# Patient Record
Sex: MALE | Race: WHITE | NOT HISPANIC OR LATINO | Employment: OTHER | URBAN - METROPOLITAN AREA
[De-identification: names, ages, dates, MRNs, and addresses within clinical notes are randomized per-mention and may not be internally consistent; named-entity substitution may affect disease eponyms.]

---

## 2018-04-03 ENCOUNTER — RECORDS - HEALTHEAST (OUTPATIENT)
Dept: LAB | Facility: CLINIC | Age: 28
End: 2018-04-03

## 2018-04-03 ENCOUNTER — HOSPITAL ENCOUNTER (OUTPATIENT)
Dept: LAB | Age: 28
Setting detail: SPECIMEN
Discharge: HOME | End: 2018-04-03

## 2018-04-04 LAB
ALBUMIN SERPL-MCNC: 3.9 G/DL (ref 3.5–5)
ALP SERPL-CCNC: 72 U/L (ref 45–120)
ALT SERPL W P-5'-P-CCNC: 18 U/L (ref 0–45)
ANION GAP SERPL CALCULATED.3IONS-SCNC: 12 MMOL/L (ref 5–18)
AST SERPL W P-5'-P-CCNC: 17 U/L (ref 0–40)
BILIRUB SERPL-MCNC: 0.2 MG/DL (ref 0–1)
BUN SERPL-MCNC: 15 MG/DL (ref 8–22)
CALCIUM SERPL-MCNC: 9.1 MG/DL (ref 8.5–10.5)
CHLORIDE BLD-SCNC: 107 MMOL/L (ref 98–107)
CO2 SERPL-SCNC: 23 MMOL/L (ref 22–31)
CREAT SERPL-MCNC: 0.83 MG/DL (ref 0.7–1.3)
ERYTHROCYTE [DISTWIDTH] IN BLOOD BY AUTOMATED COUNT: 11.9 % (ref 11–14.5)
GFR SERPL CREATININE-BSD FRML MDRD: >60 ML/MIN/1.73M2
GLUCOSE BLD-MCNC: 83 MG/DL (ref 70–125)
HCT VFR BLD AUTO: 40.6 % (ref 40–54)
HGB BLD-MCNC: 13.6 G/DL (ref 14–18)
MCH RBC QN AUTO: 30.6 PG (ref 27–34)
MCHC RBC AUTO-ENTMCNC: 33.5 G/DL (ref 32–36)
MCV RBC AUTO: 91 FL (ref 80–100)
PLATELET # BLD AUTO: 190 THOU/UL (ref 140–440)
PMV BLD AUTO: 12.4 FL (ref 8.5–12.5)
POTASSIUM BLD-SCNC: 3.7 MMOL/L (ref 3.5–5)
PROLACTIN SERPL-MCNC: 6.1 NG/ML (ref 0–15)
PROT SERPL-MCNC: 6.8 G/DL (ref 6–8)
RBC # BLD AUTO: 4.44 MILL/UL (ref 4.4–6.2)
SODIUM SERPL-SCNC: 142 MMOL/L (ref 136–145)
TSH SERPL DL<=0.005 MIU/L-ACNC: 1.22 UIU/ML (ref 0.3–5)
WBC: 6 THOU/UL (ref 4–11)

## 2019-07-26 ENCOUNTER — RECORDS - HEALTHEAST (OUTPATIENT)
Dept: LAB | Facility: CLINIC | Age: 29
End: 2019-07-26

## 2019-07-26 LAB
ALBUMIN SERPL-MCNC: 4.5 G/DL (ref 3.5–5)
ALP SERPL-CCNC: 70 U/L (ref 45–120)
ALT SERPL W P-5'-P-CCNC: 11 U/L (ref 0–45)
ANION GAP SERPL CALCULATED.3IONS-SCNC: 10 MMOL/L (ref 5–18)
AST SERPL W P-5'-P-CCNC: 14 U/L (ref 0–40)
BILIRUB SERPL-MCNC: 0.3 MG/DL (ref 0–1)
BUN SERPL-MCNC: 14 MG/DL (ref 8–22)
CALCIUM SERPL-MCNC: 9.9 MG/DL (ref 8.5–10.5)
CHLORIDE BLD-SCNC: 108 MMOL/L (ref 98–107)
CO2 SERPL-SCNC: 26 MMOL/L (ref 22–31)
CREAT SERPL-MCNC: 1.03 MG/DL (ref 0.7–1.3)
GFR SERPL CREATININE-BSD FRML MDRD: >60 ML/MIN/1.73M2
GLUCOSE BLD-MCNC: 74 MG/DL (ref 70–125)
POTASSIUM BLD-SCNC: 3.6 MMOL/L (ref 3.5–5)
PROLACTIN SERPL-MCNC: 15.1 NG/ML (ref 0–15)
PROT SERPL-MCNC: 7 G/DL (ref 6–8)
SODIUM SERPL-SCNC: 144 MMOL/L (ref 136–145)
TSH SERPL DL<=0.005 MIU/L-ACNC: 1.11 UIU/ML (ref 0.3–5)

## 2021-04-14 ENCOUNTER — VIRTUAL VISIT (OUTPATIENT)
Dept: NEUROLOGY | Facility: CLINIC | Age: 31
End: 2021-04-14
Payer: MEDICAID

## 2021-04-14 VITALS — WEIGHT: 138 LBS | HEIGHT: 70 IN | BODY MASS INDEX: 19.76 KG/M2

## 2021-04-14 DIAGNOSIS — F84.0 AUTISM: ICD-10-CM

## 2021-04-14 DIAGNOSIS — G40.409 GENERALIZED TONIC-CLONIC SEIZURE (H): ICD-10-CM

## 2021-04-14 PROCEDURE — 99442 PR PHYSICIAN TELEPHONE EVALUATION 11-20 MIN: CPT | Performed by: PSYCHIATRY & NEUROLOGY

## 2021-04-14 RX ORDER — OMEPRAZOLE 40 MG/1
CAPSULE, DELAYED RELEASE ORAL
COMMUNITY
Start: 2021-03-01

## 2021-04-14 RX ORDER — DIPHENHYDRAMINE HCL 50 MG/1
CAPSULE ORAL
COMMUNITY
Start: 2021-04-02

## 2021-04-14 RX ORDER — QUETIAPINE FUMARATE 100 MG/1
TABLET, FILM COATED ORAL
COMMUNITY
Start: 2021-04-02

## 2021-04-14 RX ORDER — CLONIDINE HYDROCHLORIDE 0.2 MG/1
TABLET ORAL
COMMUNITY
Start: 2021-04-02

## 2021-04-14 RX ORDER — LACTASE 3000 UNIT
TABLET ORAL
COMMUNITY
Start: 2021-04-06

## 2021-04-14 SDOH — HEALTH STABILITY: MENTAL HEALTH: HOW MANY STANDARD DRINKS CONTAINING ALCOHOL DO YOU HAVE ON A TYPICAL DAY?: NOT ASKED

## 2021-04-14 SDOH — HEALTH STABILITY: MENTAL HEALTH: HOW OFTEN DO YOU HAVE 6 OR MORE DRINKS ON ONE OCCASION?: NOT ASKED

## 2021-04-14 SDOH — HEALTH STABILITY: MENTAL HEALTH: HOW OFTEN DO YOU HAVE A DRINK CONTAINING ALCOHOL?: NOT ASKED

## 2021-04-14 ASSESSMENT — MIFFLIN-ST. JEOR: SCORE: 1592.21

## 2021-04-14 NOTE — LETTER
4/14/2021         RE: Wai Tinsley  [address] [address] [place] [id number]        Dear Colleague,    Thank you for referring your patient, Wai Tinsley, to the [facility] NEUROLOGY [facility]INIC [place]. Please see a copy of my visit note below.    [facility] Neurology  [place]    Wai Tinsley MRN# [id number]   Age: 30 year old YOB: 1990               Assessment and Plan:   Assessment:   Second seizure -- spontaneous versus provoked  Autism        Plan:   Orders Placed This Encounter   Procedures     EEG Routine     We will try to get an EEG to look for underlying seizure disorder.  As I discussed with Wai's caregiver from the group home, the EEG could help us decide whether we need to start him on antiseizure medication.  If we are not able to get the EEG we will have to consider going ahead with antiseizure medication.  In that case, lamotrigine might be a good 1 to try.             Chief Complaint/HPI:     This is a telephone consultation regarding Wai.  This is a 30-year-old autistic gentleman, nonverbal, who is evaluated after a seizure a couple weeks ago.  He was seen at [facility] emergency room.  He had a generalized tonic-clonic seizure lasting a couple minutes just after the car ride to his supervised employment.  EMS was called, on route he had high glucose, but in the emergency room his glucose level dropped significantly and concern was raised that hypoglycemia may have caused his seizure.  He was postictal initially (less responsive), but was back to his baseline when he was in the emergency room.  He had a similar episode back on April 19, 2019, again seen at [facility].  At the request of his family, no antiseizure medications were started at that time.  Staff were told that he already had a neurology appointment coming up the following week.  He did have an appointment at our office then on June 12, 2019 but did not come to that appointment.    CT  of the head done March 29, 2021 is normal, images personally reviewed            Past Medical History:    has no past medical history on file.          Past Surgical History:    has no past surgical history on file.          Social History:     Social History     Tobacco Use     Smoking status: Never Smoker     Smokeless tobacco: Never Used   Substance Use Topics     Alcohol use: Not Currently             Family History:     Family History   Family history unknown: Yes                Allergies:   No Known Allergies          Medications:     Current Outpatient Medications:      BANOPHEN 50 MG capsule, , Disp: , Rfl:      cloNIDine (CATAPRES) 0.2 MG tablet, , Disp: , Rfl:      LACTASE ENZYME 3000 units tablet, , Disp: , Rfl:      omeprazole (PRILOSEC) 40 MG DR capsule, , Disp: , Rfl:      QUEtiapine (SEROQUEL) 100 MG tablet, , Disp: , Rfl:               Physical Exam:   The patient is nonverbal, I was unable to evaluate him on today's telephone visit  He is described as awake, interactive and at baseline.       13 minutes were spent on the phone today.      Jose Vick MD            Again, thank you for allowing me to participate in the care of your patient.        Sincerely,        Jose Vick MD

## 2021-04-14 NOTE — PROGRESS NOTES
[facility] Neurology  [place]    Wai Tinsley MRN# [id number]   Age: 30 year old YOB: 1990               Assessment and Plan:   Assessment:   Second seizure -- spontaneous versus provoked  Autism        Plan:   Orders Placed This Encounter   Procedures     EEG Routine     We will try to get an EEG to look for underlying seizure disorder.  As I discussed with Wai's caregiver from the group home, the EEG could help us decide whether we need to start him on antiseizure medication.  If we are not able to get the EEG we will have to consider going ahead with antiseizure medication.  In that case, lamotrigine might be a good 1 to try.             Chief Complaint/HPI:     This is a telephone consultation regarding Wai.  This is a 30-year-old autistic gentleman, nonverbal, who is evaluated after a seizure a couple weeks ago.  He was seen at [facility] emergency room.  He had a generalized tonic-clonic seizure lasting a couple minutes just after the car ride to his supervised employment.  EMS was called, on route he had high glucose, but in the emergency room his glucose level dropped significantly and concern was raised that hypoglycemia may have caused his seizure.  He was postictal initially (less responsive), but was back to his baseline when he was in the emergency room.  He had a similar episode back on April 19, 2019, again seen at [facility].  At the request of his family, no antiseizure medications were started at that time.  Staff were told that he already had a neurology appointment coming up the following week.  He did have an appointment at our office then on June 12, 2019 but did not come to that appointment.    CT of the head done March 29, 2021 is normal, images personally reviewed            Past Medical History:    has no past medical history on file.          Past Surgical History:    has no past surgical history on file.          Social History:     Social History      Tobacco Use     Smoking status: Never Smoker     Smokeless tobacco: Never Used   Substance Use Topics     Alcohol use: Not Currently             Family History:     Family History   Family history unknown: Yes                Allergies:   No Known Allergies          Medications:     Current Outpatient Medications:      BANOPHEN 50 MG capsule, , Disp: , Rfl:      cloNIDine (CATAPRES) 0.2 MG tablet, , Disp: , Rfl:      LACTASE ENZYME 3000 units tablet, , Disp: , Rfl:      omeprazole (PRILOSEC) 40 MG DR capsule, , Disp: , Rfl:      QUEtiapine (SEROQUEL) 100 MG tablet, , Disp: , Rfl:               Physical Exam:   The patient is nonverbal, I was unable to evaluate him on today's telephone visit  He is described as awake, interactive and at baseline.       13 minutes were spent on the phone today.      Jose Vick MD

## 2022-06-24 ENCOUNTER — HOSPITAL ENCOUNTER (EMERGENCY)
Facility: HOSPITAL | Age: 32
Discharge: HOME | End: 2022-06-24
Attending: EMERGENCY MEDICINE | Admitting: EMERGENCY MEDICINE
Payer: MEDICAID

## 2022-06-24 ENCOUNTER — DOCUMENTATION ONLY (OUTPATIENT)
Dept: OTHER | Facility: CLINIC | Age: 32
End: 2022-06-24

## 2022-06-24 VITALS
SYSTOLIC BLOOD PRESSURE: 102 MMHG | HEART RATE: 78 BPM | OXYGEN SATURATION: 100 % | TEMPERATURE: 99 F | BODY MASS INDEX: 20.81 KG/M2 | WEIGHT: 145 LBS | RESPIRATION RATE: 18 BRPM | DIASTOLIC BLOOD PRESSURE: 62 MMHG

## 2022-06-24 DIAGNOSIS — R56.9 SEIZURE (H): ICD-10-CM

## 2022-06-24 LAB
ANION GAP SERPL CALCULATED.3IONS-SCNC: 4 MMOL/L (ref 5–18)
BASOPHILS # BLD AUTO: 0 10E3/UL (ref 0–0.2)
BASOPHILS NFR BLD AUTO: 1 %
BUN SERPL-MCNC: 14 MG/DL (ref 8–22)
CALCIUM SERPL-MCNC: 9 MG/DL (ref 8.5–10.5)
CHLORIDE BLD-SCNC: 110 MMOL/L (ref 98–107)
CO2 SERPL-SCNC: 27 MMOL/L (ref 22–31)
CREAT SERPL-MCNC: 0.9 MG/DL (ref 0.7–1.3)
EOSINOPHIL # BLD AUTO: 0.1 10E3/UL (ref 0–0.7)
EOSINOPHIL NFR BLD AUTO: 2 %
ERYTHROCYTE [DISTWIDTH] IN BLOOD BY AUTOMATED COUNT: 11.9 % (ref 10–15)
GFR SERPL CREATININE-BSD FRML MDRD: >90 ML/MIN/1.73M2
GLUCOSE BLD-MCNC: 62 MG/DL (ref 70–125)
HCT VFR BLD AUTO: 38.3 % (ref 40–53)
HGB BLD-MCNC: 12.6 G/DL (ref 13.3–17.7)
IMM GRANULOCYTES # BLD: 0 10E3/UL
IMM GRANULOCYTES NFR BLD: 0 %
LYMPHOCYTES # BLD AUTO: 0.9 10E3/UL (ref 0.8–5.3)
LYMPHOCYTES NFR BLD AUTO: 31 %
MCH RBC QN AUTO: 30.5 PG (ref 26.5–33)
MCHC RBC AUTO-ENTMCNC: 32.9 G/DL (ref 31.5–36.5)
MCV RBC AUTO: 93 FL (ref 78–100)
MONOCYTES # BLD AUTO: 0.3 10E3/UL (ref 0–1.3)
MONOCYTES NFR BLD AUTO: 10 %
NEUTROPHILS # BLD AUTO: 1.5 10E3/UL (ref 1.6–8.3)
NEUTROPHILS NFR BLD AUTO: 56 %
NRBC # BLD AUTO: 0 10E3/UL
NRBC BLD AUTO-RTO: 0 /100
PLATELET # BLD AUTO: 141 10E3/UL (ref 150–450)
POTASSIUM BLD-SCNC: 4 MMOL/L (ref 3.5–5)
RBC # BLD AUTO: 4.13 10E6/UL (ref 4.4–5.9)
SODIUM SERPL-SCNC: 141 MMOL/L (ref 136–145)
WBC # BLD AUTO: 2.7 10E3/UL (ref 4–11)

## 2022-06-24 PROCEDURE — 250N000013 HC RX MED GY IP 250 OP 250 PS 637: Performed by: EMERGENCY MEDICINE

## 2022-06-24 PROCEDURE — 36415 COLL VENOUS BLD VENIPUNCTURE: CPT | Performed by: EMERGENCY MEDICINE

## 2022-06-24 PROCEDURE — 82310 ASSAY OF CALCIUM: CPT | Performed by: EMERGENCY MEDICINE

## 2022-06-24 PROCEDURE — 85004 AUTOMATED DIFF WBC COUNT: CPT | Performed by: EMERGENCY MEDICINE

## 2022-06-24 PROCEDURE — 99283 EMERGENCY DEPT VISIT LOW MDM: CPT

## 2022-06-24 RX ORDER — QUETIAPINE FUMARATE 100 MG/1
300 TABLET, FILM COATED ORAL ONCE
Status: COMPLETED | OUTPATIENT
Start: 2022-06-24 | End: 2022-06-24

## 2022-06-24 RX ORDER — DIVALPROEX SODIUM 500 MG/1
500 TABLET, EXTENDED RELEASE ORAL AT BEDTIME
Qty: 45 TABLET | Refills: 0 | Status: SHIPPED | OUTPATIENT
Start: 2022-06-25 | End: 2022-08-09

## 2022-06-24 RX ORDER — DIVALPROEX SODIUM 500 MG/1
500 TABLET, EXTENDED RELEASE ORAL ONCE
Status: COMPLETED | OUTPATIENT
Start: 2022-06-24 | End: 2022-06-24

## 2022-06-24 RX ADMIN — DIVALPROEX SODIUM 500 MG: 500 TABLET, FILM COATED, EXTENDED RELEASE ORAL at 13:21

## 2022-06-24 RX ADMIN — QUETIAPINE FUMARATE 300 MG: 100 TABLET ORAL at 13:22

## 2022-06-24 NOTE — ED TRIAGE NOTES
Patient resides in group home for autism. He was reaching to pick something up, eyes rolled back into head and he fell. Staff reports 5-7 minutes of shaking seizure activity. He has been alert since this episode. Unsure if its seizure or behavioral. 136 blood sugar en route.      Triage Assessment     Row Name 06/24/22 1044       Triage Assessment (Adult)    Airway WDL WDL    Additional Documentation Breath Sounds (Group)       Respiratory WDL    Respiratory WDL WDL       Breath Sounds    Breath Sounds All Fields       Skin Circulation/Temperature WDL    Skin Circulation/Temperature WDL WDL       Cardiac WDL    Cardiac WDL WDL       Peripheral/Neurovascular WDL    Peripheral Neurovascular WDL WDL       Cognitive/Neuro/Behavioral WDL    Cognitive/Neuro/Behavioral WDL arousability;level of consciousness    Level of Consciousness alert    Arousal Level opens eyes spontaneously

## 2022-06-24 NOTE — DISCHARGE INSTRUCTIONS
Wai was seen today in the emergency department at [facility] for seizure.  Lab studies today looked stable.  We discussed his case with neurology.  They would like to start him on Depakote extended release at nighttime 500 mg daily.  This was sent to his pharmacy.  He was given a first dose here in the emergency department today and can start the medication as soon as tomorrow.  Please continue monitoring him and return to the emergency department if he has any further episodes.  The neurology clinic would like to see him as soon as possible within the next few weeks to continue his care.

## 2022-06-24 NOTE — ED PROVIDER NOTES
EMERGENCY DEPARTMENT ENCOUNTER      NAME: Wai Tinsley  AGE: 31 year old male  YOB: 1990  MRN: [id number]  EVALUATION DATE & TIME: 6/24/2022 10:38 AM    PCP: Dariela Leiva    ED PROVIDER: Osbaldo Valencia M.D.      Chief Complaint   Patient presents with     Seizures       FINAL IMPRESSION:  1. Seizure (H)        ED COURSE & MEDICAL DECISION MAKING:    10:41 AM I met with the patient to gather history and to perform my initial exam. We discussed plans for the ED course, including diagnostic testing and treatment.   11:41 AM Rechecked and updated patient. I discussed plans for discharge with the patient, which they were agreeable to. We discussed supportive cares at home and reasons for return to the ER including new or worsening symptoms. All questions and concerns were addressed. Patient to be discharged by RN.      31 year old male presents to the Emergency Department for evaluation of seizure.  Patient is vitally stable and well-appearing when he arrives to the emergency department.  He is back to his neurological baseline per facility staff.  He has had a couple episodes of described seizure activity in the past, was unable to follow through with any EEG given difficulty with getting tests done due to his severe autism.  Patient is able to follow simple commands here.  Blood sugar in route was within normal limits, here it is borderline low in the 60s., he was given some food.  With now 3 episodes of similar tonic-clonic seizure activity, I do think he would benefit from an antiepileptic agent.  I discussed the case with Dr. Maciel of the neurology service.  We are going to start him on Depakote 500 mg at bedtime which also might help with some of his behavioral complications from his autism.  He had sounds like already has plans to follow-up in neurology clinic in July within the next few weeks so I think this would be a good follow-up interval to review this episode and come up with a  long-term treatment plan from clinic.  I do not think any new neuroimaging would be required given absence of any trauma and that this was completed within the last few years.  Discussed everything with both his mother over the phone and staff from his facility.  They are comfortable with the plan.  Patient was discharged in good condition.      MEDICATIONS GIVEN IN THE EMERGENCY:  Medications   divalproex sodium extended-release (DEPAKOTE ER) 24 hr tablet 500 mg (has no administration in time range)       NEW PRESCRIPTIONS STARTED AT TODAY'S ER VISIT  New Prescriptions    DIVALPROEX SODIUM EXTENDED-RELEASE (DEPAKOTE ER) 500 MG 24 HR TABLET    Take 1 tablet (500 mg) by mouth At Bedtime for 45 days          =================================================================    HPI    HPI is severely limited due to patient cognitive impairment.     Patient information was obtained from: Patient's day program staff    Use of : N/A      Wai Tinsley is a 31 year old male with a pertinent medical history of autism, developmental non-verbal disorder, seizure, generalized tonic-clonic seizure, hypoglycemia who presents to this ED via EMS for evaluation of fall and seizure-like activity.    Patient's day program staff reports that just prior to arrival the patient bent over to reach for something when the patient's eyes rolled back, he fell to the left-side, and then had approximately 5-7 minutes of seizure-like activity. They state following this episode the patient was helped to a chair where he gradually became alert and back to his baseline self. They also mention that prior to the fall and seizure-like activity the patient was at baseline and at normal state of health. The report that en route to the ED EMS found the patient's blood sugar to be 136. They endorse knowing only one episode of patient seizure in the past. They endorse a patient PMH of autism and note he is non-verbal at baseline. They deny any  other patient complications at this time.       REVIEW OF SYSTEMS   Unable to obtain ROS due to patient cognitive impairment.     PAST MEDICAL HISTORY:  History reviewed. No pertinent past medical history.    PAST SURGICAL HISTORY:  History reviewed. No pertinent surgical history.        CURRENT MEDICATIONS:    Current Facility-Administered Medications   Medication     divalproex sodium extended-release (DEPAKOTE ER) 24 hr tablet 500 mg     Current Outpatient Medications   Medication     [START ON 6/25/2022] divalproex sodium extended-release (DEPAKOTE ER) 500 MG 24 hr tablet     BANOPHEN 50 MG capsule     cloNIDine (CATAPRES) 0.2 MG tablet     LACTASE ENZYME 3000 units tablet     omeprazole (PRILOSEC) 40 MG DR capsule     QUEtiapine (SEROQUEL) 100 MG tablet         ALLERGIES:  Allergies   Allergen Reactions     Other Environmental Allergy Unknown       FAMILY HISTORY:  Family History   Family history unknown: Yes       SOCIAL HISTORY:   Social History     Socioeconomic History     Marital status: Single   Tobacco Use     Smoking status: Never Smoker     Smokeless tobacco: Never Used   Substance and Sexual Activity     Alcohol use: Not Currently       VITALS:  /62   Pulse 78   Temp 99  F (37.2  C)   Resp 18   Wt 65.8 kg (145 lb)   SpO2 100%   BMI 20.81 kg/m      PHYSICAL EXAM    Constitutional: Well developed, Well nourished, NAD.  HENT: Normocephalic, Atraumatic. Neck Supple.  Eyes: EOMI, Conjunctiva normal.  Respiratory: Breathing comfortably on room air. Lungs clear to ascultation.  Cardiovascular: Normal heart rate, Regular rhythm. No peripheral edema.  Abdomen: Soft, nontender  Musculoskeletal: Good range of motion in all major joints. No major deformities noted.  Integument: Warm, Dry.  Neurologic: Awake and alert.  Nonverbal (baseline).  Face is symmetric.  Moving all extremities purposefully and following simple commands.  Psychiatric: Cooperative. Affect appropriate.     LAB:  All pertinent labs  reviewed and interpreted.  Labs Ordered and Resulted from Time of ED Arrival to Time of ED Departure   BASIC METABOLIC PANEL - Abnormal       Result Value    Sodium 141      Potassium 4.0      Chloride 110 (*)     Carbon Dioxide (CO2) 27      Anion Gap 4 (*)     Urea Nitrogen 14      Creatinine 0.90      Calcium 9.0      Glucose 62 (*)     GFR Estimate >90     CBC WITH PLATELETS AND DIFFERENTIAL - Abnormal    WBC Count 2.7 (*)     RBC Count 4.13 (*)     Hemoglobin 12.6 (*)     Hematocrit 38.3 (*)     MCV 93      MCH 30.5      MCHC 32.9      RDW 11.9      Platelet Count 141 (*)     % Neutrophils 56      % Lymphocytes 31      % Monocytes 10      % Eosinophils 2      % Basophils 1      % Immature Granulocytes 0      NRBCs per 100 WBC 0      Absolute Neutrophils 1.5 (*)     Absolute Lymphocytes 0.9      Absolute Monocytes 0.3      Absolute Eosinophils 0.1      Absolute Basophils 0.0      Absolute Immature Granulocytes 0.0      Absolute NRBCs 0.0           I, Anatoly Bernard, am serving as a scribe to document services personally performed by Dr. Osbaldo Valencia, based on my observation and the provider's statements to me. I, Osbaldo Valencia MD attest that Anatoly Bernard is acting in a scribe capacity, has observed my performance of the services and has documented them in accordance with my direction.    Osbaldo Valencia M.D.  Emergency Medicine  [facility] EMERGENCY DEPARTMENT  [address] [place] [id number] [phone]  Dept: [phone]       Osbaldo Valencia MD  06/24/22 122

## 2022-07-31 ENCOUNTER — HEALTH MAINTENANCE LETTER (OUTPATIENT)
Age: 32
End: 2022-07-31

## 2022-10-15 ENCOUNTER — HEALTH MAINTENANCE LETTER (OUTPATIENT)
Age: 32
End: 2022-10-15

## 2023-06-14 ENCOUNTER — HOSPITAL ENCOUNTER (EMERGENCY)
Facility: HOSPITAL | Age: 33
Discharge: HOME | End: 2023-06-14
Attending: EMERGENCY MEDICINE | Admitting: EMERGENCY MEDICINE
Payer: MEDICAID

## 2023-06-14 ENCOUNTER — TELEPHONE (OUTPATIENT)
Dept: EMERGENCY MEDICINE | Facility: HOSPITAL | Age: 33
End: 2023-06-14

## 2023-06-14 VITALS
DIASTOLIC BLOOD PRESSURE: 77 MMHG | RESPIRATION RATE: 16 BRPM | TEMPERATURE: 98 F | HEIGHT: 69 IN | OXYGEN SATURATION: 99 % | HEART RATE: 81 BPM | SYSTOLIC BLOOD PRESSURE: 121 MMHG | BODY MASS INDEX: 21.49 KG/M2 | WEIGHT: 145.06 LBS

## 2023-06-14 DIAGNOSIS — G40.909 NONINTRACTABLE EPILEPSY WITHOUT STATUS EPILEPTICUS, UNSPECIFIED EPILEPSY TYPE (H): ICD-10-CM

## 2023-06-14 LAB
ALBUMIN SERPL BCG-MCNC: 3.9 G/DL (ref 3.5–5.2)
ALP SERPL-CCNC: 64 U/L (ref 40–129)
ALT SERPL W P-5'-P-CCNC: 90 U/L (ref 0–70)
ANION GAP SERPL CALCULATED.3IONS-SCNC: 14 MMOL/L (ref 7–15)
AST SERPL W P-5'-P-CCNC: 56 U/L (ref 0–45)
BILIRUB SERPL-MCNC: 0.2 MG/DL
BUN SERPL-MCNC: 13.2 MG/DL (ref 6–20)
CALCIUM SERPL-MCNC: 9.2 MG/DL (ref 8.6–10)
CHLORIDE SERPL-SCNC: 103 MMOL/L (ref 98–107)
CREAT SERPL-MCNC: 1.03 MG/DL (ref 0.67–1.17)
DEPRECATED HCO3 PLAS-SCNC: 18 MMOL/L (ref 22–29)
GFR SERPL CREATININE-BSD FRML MDRD: >90 ML/MIN/1.73M2
GLUCOSE BLDC GLUCOMTR-MCNC: 70 MG/DL (ref 70–99)
GLUCOSE SERPL-MCNC: 66 MG/DL (ref 70–99)
HOLD SPECIMEN: NORMAL
POTASSIUM SERPL-SCNC: 4.7 MMOL/L (ref 3.4–5.3)
PROT SERPL-MCNC: 7.1 G/DL (ref 6.4–8.3)
SODIUM SERPL-SCNC: 135 MMOL/L (ref 136–145)
VALPROATE SERPL-MCNC: 47.1 UG/ML

## 2023-06-14 PROCEDURE — 99283 EMERGENCY DEPT VISIT LOW MDM: CPT

## 2023-06-14 PROCEDURE — 80164 ASSAY DIPROPYLACETIC ACD TOT: CPT | Performed by: EMERGENCY MEDICINE

## 2023-06-14 PROCEDURE — 80053 COMPREHEN METABOLIC PANEL: CPT | Performed by: EMERGENCY MEDICINE

## 2023-06-14 PROCEDURE — 82962 GLUCOSE BLOOD TEST: CPT

## 2023-06-14 PROCEDURE — 250N000013 HC RX MED GY IP 250 OP 250 PS 637: Performed by: EMERGENCY MEDICINE

## 2023-06-14 PROCEDURE — 36415 COLL VENOUS BLD VENIPUNCTURE: CPT | Performed by: EMERGENCY MEDICINE

## 2023-06-14 RX ORDER — DIVALPROEX SODIUM 500 MG/1
500 TABLET, EXTENDED RELEASE ORAL ONCE
Status: COMPLETED | OUTPATIENT
Start: 2023-06-14 | End: 2023-06-14

## 2023-06-14 RX ADMIN — DIVALPROEX SODIUM 500 MG: 500 TABLET, FILM COATED, EXTENDED RELEASE ORAL at 14:26

## 2023-06-14 ASSESSMENT — ACTIVITIES OF DAILY LIVING (ADL)
ADLS_ACUITY_SCORE: 35
ADLS_ACUITY_SCORE: 35

## 2023-06-14 NOTE — ED PROVIDER NOTES
"EMERGENCY DEPARTMENT ENCOUNTER            IMPRESSION:  Epileptic seizure        MEDICAL DECISION MAKING:  It was my pleasure to provide care for Wai Tinsley who presented for evaluation of seizure.  Patient has history of seizure disorder.  Care staff reports compliance with medication.  Seizure was prolonged and Valium was administered prehospital.    Patient arrived awake and alert without any postictal symptoms or distress    Seizure precautions ordered    Significant laboratory findings include subtherapeutic Depakote.  Additional Depakote was administered.  Bicarb slightly low    ED evaluation is consistent with elliptic seizure.  May have been precipitated by subtherapeutic Depakote.      Spoke with guardian and group home staff    Return to care facility    =================================================================  CHIEF COMPLAINT:  Chief Complaint   Patient presents with     Seizures     Witnessed tonic clonic seizure at [facility] day [facility]; pt lives in group home-  notified group home; pt has a history of seizures; vitals stable; pt is non verbal autistic; per attendant pt is a \"runner\". Day care administered 2mg valium (buccal); pt has had no more seizure activity.          HPI  Wai Tinsley is a 32 year old male with a history of autism, seizure, developmental non verbal disorder who presents to the ED by EMS for evaluation of seizures.    Per home staff, patient had a prolonged seizure today that lasted for 6 minutes. He has a history of seizures but they have never lasted this long which prompted staff to call EMS.  Patient has been taking his daily medications.      REVIEW OF SYSTEMS   Constitutional: Does not report chills, unintentional weight loss or fatigue   Eyes: Does not report visual changes or discharge    HENT: Does not report sore throat, ear pain or neck pain  Respiratory: Does not report cough or shortness of breath    Cardiovascular: Does not report chest pain, " "palpitations or leg swelling  GI: Does not report abdominal pain, nausea, vomiting, or dark, bloody stools.    : Does not report hematuria, dysuria, or flank pain  Musculoskeletal: Does not report any new musculoskeletal pain or new muscle/joint pains  Skin: Does not report rash or wound  Neurologic: Does not report current headache, new weakness, focal weakness, or sensory changes. Positive for seizures.       Remainder of systems reviewed, unless noted in HPI all others negative.      PAST MEDICAL HISTORY:  No past medical history on file.    PAST SURGICAL HISTORY:  No past surgical history on file.      CURRENT MEDICATIONS:    BANOPHEN 50 MG capsule  cloNIDine (CATAPRES) 0.2 MG tablet  divalproex sodium extended-release (DEPAKOTE ER) 500 MG 24 hr tablet  LACTASE ENZYME 3000 units tablet  omeprazole (PRILOSEC) 40 MG DR capsule  QUEtiapine (SEROQUEL) 100 MG tablet        ALLERGIES:  Allergies   Allergen Reactions     Other Environmental Allergy Unknown       FAMILY HISTORY:  Family History   Family history unknown: Yes       SOCIAL HISTORY:   Social History     Socioeconomic History     Marital status: Single   Tobacco Use     Smoking status: Never     Smokeless tobacco: Never   Substance and Sexual Activity     Alcohol use: Not Currently       PHYSICAL EXAM:    /79   Pulse 86   Temp 98  F (36.7  C) (Oral)   Resp 18   Ht 1.753 m (5' 9\")   Wt 65.8 kg (145 lb 1 oz)   SpO2 94%   BMI 21.42 kg/m      Constitutional: Awake alert no distress, nonverbal  Head: Normocephalic, atraumatic.  ENT: Mucous membranes are moist.  No pallor.   Eyes: Pupils are reactive.  No discoloration.  Neck: No lymphadenopathy, no stridor, supple, no soft tissue swelling  Chest: No tenderness   Respiratory: Respirations even, unlabored. Lungs clear to ascultation bilaterally, in no acute respiratory distress.  Cardiovascular: Regular rate and rhythm.  Good overall perfusion.  Upper and lower extremity pulses are equal.  GI: " Abdomen soft, non-tender to palpation.  No guarding or rebound. Bowel sounds present throughout.   Back: No CVA tenderness.    Musculoskeletal: Moves all 4 extremities equally, full function and capacity no peripheral edema.   Integument: Warm, dry. No rash. No bruising or petechiae.  Neurologic: Alert & oriented x 3. Normal speech. Grossly normal motor and sensory function. No focal deficits noted.  NIHSS = 0  Psychiatric: Normal mood and affect.  Appropriate judgement.    ED COURSE:  12:36 PM I introduced myself to the patient, obtained patient history, performed a physical exam, and discussed plan for ED workup including potential diagnostic laboratory/imaging studies and interventions.  1:56 PM I spoke with patient's mother Kaitlynn ([phone]) regarding patient.     Medical Decision Making    History:    Supplemental history from: Family group home staff paramedics    External Record(s) reviewed: External medical records including care everywhere reviewed    Work Up:    Laboratory and imaging studies as ordered were independently reviewed by the provider    Broad differential diagnosis considered for seizure    The patient's presentation was of moderate complexity.       Complicating factors:    Patient has a complicated past medical history including seizure    Care affected by social determinants of health: Access to primary care    Disposition involved shared decision-making with the patient.  Patient otherwise to continue outpatient medications as prescribed.       LAB:  Laboratory results were independently reviewed and interpreted  Results for orders placed or performed during the hospital encounter of 06/14/23   Comprehensive metabolic panel   Result Value Ref Range    Sodium 135 (L) 136 - 145 mmol/L    Potassium 4.7 3.4 - 5.3 mmol/L    Chloride 103 98 - 107 mmol/L    Carbon Dioxide (CO2) 18 (L) 22 - 29 mmol/L    Anion Gap 14 7 - 15 mmol/L    Urea Nitrogen 13.2 6.0 - 20.0 mg/dL    Creatinine 1.03 0.67 -  1.17 mg/dL    Calcium 9.2 8.6 - 10.0 mg/dL    Glucose 66 (L) 70 - 99 mg/dL    Alkaline Phosphatase 64 40 - 129 U/L    AST 56 (H) 0 - 45 U/L    ALT 90 (H) 0 - 70 U/L    Protein Total 7.1 6.4 - 8.3 g/dL    Albumin 3.9 3.5 - 5.2 g/dL    Bilirubin Total 0.2 <=1.2 mg/dL    GFR Estimate >90 >60 mL/min/1.73m2   Valproic acid (Depakote level)   Result Value Ref Range    Valproic acid 47.1 (L)   ug/mL   Glucose by meter   Result Value Ref Range    GLUCOSE BY METER POCT 70 70 - 99 mg/dL           MEDICATIONS GIVEN IN THE EMERGENCY:  Medications   divalproex sodium extended-release (DEPAKOTE ER) 24 hr tablet 500 mg (500 mg Oral $Given 6/14/23 0884)           NEW PRESCRIPTIONS STARTED AT TODAY'S ER VISIT:  New Prescriptions    No medications on file                FINAL DIAGNOSIS:    ICD-10-CM    1. Nonintractable epilepsy without status epilepticus, unspecified epilepsy type (H)  G40.909                  NAME: Wai Tinsley  AGE: 32 year old male  YOB: 1990  MRN: [id number]  EVALUATION DATE & TIME: 6/14/2023 12:18 PM    PCP: Luis Manuel Sanchez    ED PROVIDER: LUCIUS Garcia Thongxengzenghocha Lee, am serving as a scribe to document services personally performed by Dr. Dion Giraldo based on my observation and the provider's statements to me. IDion MD attest that Yue Cannon is acting in a scribe capacity, has observed my performance of the services and has documented them in accordance with my direction.    Dion Giraldo M.D.  Emergency Medicine  [facility] EMERGENCY DEPARTMENT  [facility] [facility] [address]  [phone]  Dept: [phone]  6/14/2023       Dion Giraldo MD  06/14/23 1877

## 2023-06-14 NOTE — ED NOTES
[facility]  Expected date: 6/14/23  Expected time:   Means of arrival:   Comments:  32 male  6 minute seizure, history of seizures   Received  2 mg's of oral valium prior to EMS arrival   Nonverbal at baseline

## 2023-06-14 NOTE — DISCHARGE INSTRUCTIONS
Your Depakote level was subtherapeutic  You were given an extra dose of Depakote; take medications as prescribed

## 2023-06-14 NOTE — ED TRIAGE NOTES
"Witnessed tonic clonic seizure at adult day [facility]; pt lives in group home-  notified group home; pt has a history of seizures; vitals stable; pt is non verbal autistic; per attendant pt is a \"runner\". Day care administered 2mg valium (buccal); pt has had no more seizure activity.      Triage Assessment     Row Name 06/14/23 1236       Triage Assessment (Adult)    Airway WDL WDL       Respiratory WDL    Respiratory WDL WDL       Skin Circulation/Temperature WDL    Skin Circulation/Temperature WDL WDL       Cardiac WDL    Cardiac WDL WDL       Peripheral/Neurovascular WDL    Peripheral Neurovascular WDL WDL       Cognitive/Neuro/Behavioral WDL    Cognitive/Neuro/Behavioral WDL WDL  pt is non verbal autistic              "

## 2023-08-20 ENCOUNTER — HEALTH MAINTENANCE LETTER (OUTPATIENT)
Age: 33
End: 2023-08-20

## 2024-10-13 ENCOUNTER — HEALTH MAINTENANCE LETTER (OUTPATIENT)
Age: 34
End: 2024-10-13